# Patient Record
Sex: MALE | Race: WHITE | URBAN - METROPOLITAN AREA
[De-identification: names, ages, dates, MRNs, and addresses within clinical notes are randomized per-mention and may not be internally consistent; named-entity substitution may affect disease eponyms.]

---

## 2021-09-19 ENCOUNTER — HOSPITAL ENCOUNTER (EMERGENCY)
Age: 31
Discharge: HOME | End: 2021-09-19
Attending: EMERGENCY MEDICINE

## 2021-09-19 VITALS
TEMPERATURE: 98.7 F | WEIGHT: 215 LBS | HEIGHT: 73 IN | SYSTOLIC BLOOD PRESSURE: 165 MMHG | HEART RATE: 78 BPM | RESPIRATION RATE: 16 BRPM | DIASTOLIC BLOOD PRESSURE: 87 MMHG | BODY MASS INDEX: 28.49 KG/M2 | OXYGEN SATURATION: 96 %

## 2021-09-19 DIAGNOSIS — F10.920 ALCOHOLIC INTOXICATION WITHOUT COMPLICATION (HCC): Primary | ICD-10-CM

## 2021-09-19 DIAGNOSIS — S61.419A LACERATION OF HAND WITHOUT FOREIGN BODY, UNSPECIFIED LATERALITY, INITIAL ENCOUNTER: ICD-10-CM

## 2021-09-19 DIAGNOSIS — R46.89 ABNORMAL BEHAVIOR: ICD-10-CM

## 2021-09-19 PROCEDURE — 99282 EMERGENCY DEPT VISIT SF MDM: CPT

## 2021-09-19 RX ORDER — BACITRACIN 500 UNIT/G
1 PACKET (EA) TOPICAL ONCE
Status: DISCONTINUED | OUTPATIENT
Start: 2021-09-19 | End: 2021-09-19 | Stop reason: HOSPADM

## 2021-09-19 NOTE — ED NOTES
..Discharge summary and discharge medications reviewed with patient and appropriate educational materials and side effects teaching were provided. patient  Given 0 paper prescriptions and 0 electronic prescriptions sent to pt's listed pharmacy. Patient  verbalized understanding of the importance of discussing medications with his or her physician or clinic they will be following up with. No si/s of acute distress prior to discharge. Patient offered wheelchair from treatment area to hospital entrance, patient delined wheelchair.

## 2021-09-19 NOTE — ED PROVIDER NOTES
EMERGENCY DEPARTMENT HISTORY AND PHYSICAL EXAM      Date: 9/19/2021  Patient Name: Dawit Chowdhury  Patient Age and Sex: 32 y.o. male     History of Presenting Illness     Chief Complaint   Patient presents with    Laceration       History Provided By: Patient    HPI: Dawit Chowdhury  Is brought to the ED under police custody for medical clearance. He was found by the police in a [employer] where he had broken in. Admitted to drinking alcohol and currently cannot remember why he went to [employer] in the first place. No medical issues. Has few superficial lacerations and abrasions to right hand, likely from breaking and entering. No other trauma or injuries. During my assessment patient is alert and oriented. Clinically sober enough to provide clear history as well as he can remember. Clear speech. Cooperative. There are no other complaints, changes, or physical findings at this time. PCP: No primary care provider on file. No current facility-administered medications on file prior to encounter. No current outpatient medications on file prior to encounter. Past History     Past Medical History:  History reviewed. No pertinent past medical history. Past Surgical History:  History reviewed. No pertinent surgical history. Family History:  History reviewed. No pertinent family history. Social History:  Social History     Tobacco Use    Smoking status: Never Smoker   Substance Use Topics    Alcohol use: Yes    Drug use: Not Currently       Allergies:  No Known Allergies      Review of Systems   Review of Systems   Constitutional: Negative for appetite change and fever. HENT: Negative. Eyes: Negative. Respiratory: Negative for cough and shortness of breath. Cardiovascular: Negative for chest pain and palpitations. Gastrointestinal: Negative for abdominal pain, nausea and vomiting. Endocrine: Negative. Genitourinary: Negative for dysuria, flank pain and hematuria. Musculoskeletal: Negative for back pain and joint swelling. Skin: Negative. Negative for color change and rash. Neurological: Negative for dizziness, weakness, light-headedness, numbness and headaches. Hematological: Negative for adenopathy. All other systems reviewed and are negative. Physical Exam   Physical Exam  Vitals and nursing note reviewed. HENT:      Head: Atraumatic. Mouth/Throat:      Mouth: Mucous membranes are moist.   Eyes:      General: No scleral icterus. Extraocular Movements: Extraocular movements intact. Conjunctiva/sclera: Conjunctivae normal.      Pupils: Pupils are equal, round, and reactive to light. Cardiovascular:      Rate and Rhythm: Normal rate and regular rhythm. Pulses: Normal pulses. Heart sounds: Normal heart sounds. Pulmonary:      Effort: Pulmonary effort is normal.      Breath sounds: Normal breath sounds. Abdominal:      Palpations: Abdomen is soft. Tenderness: There is no abdominal tenderness. Musculoskeletal:         General: No swelling or deformity. Normal range of motion. Cervical back: Normal range of motion and neck supple. Skin:     General: Skin is warm and dry. Capillary Refill: Capillary refill takes less than 2 seconds. Comments: Few superficial abrasions and lacerations on fingers of both hands. Hemostatic. No foreign bodies. Full rom. No swelling. Good cap refill in all fingers   Neurological:      General: No focal deficit present. Mental Status: He is alert. Psychiatric:         Mood and Affect: Mood normal.         Behavior: Behavior normal.          Diagnostic Study Results     Labs -   No results found for this or any previous visit (from the past 12 hour(s)). Radiologic Studies -   No orders to display     CT Results  (Last 48 hours)    None        CXR Results  (Last 48 hours)    None            Medical Decision Making   I am the first provider for this patient.     I reviewed the vital signs, available nursing notes, past medical history, past surgical history, family history and social history. Vital Signs-Reviewed the patient's vital signs. Patient Vitals for the past 12 hrs:   Temp Pulse Resp BP SpO2   09/19/21 0541  78      09/19/21 0525 98.7 °F (37.1 °C) (!) 113 16 (!) 165/87 96 %       Records Reviewed: Nursing Notes and Old Medical Records    Provider Notes (Medical Decision Making):   Patient is in the emergency room in police custody for medical clearance prior to going to [facility]. He is now alert and oriented, cooperative. Denies history of medical problems or recent illness. Admits to drinking too much this evening and, now that he has sobered up, he is unable to explain how he ended up at [employer]. Other than superficial injuries to his hand as described above, he has a benign physical exam.   Denies si/hi. Denies illicit drug use. Vital signs are normal.  We will clean and dress abrasions on hand appropriately. He reports that his tetanus is utd. Lacerations and abrasions are very superficial and no need for sutures. Will apply steri strips or similar dressing instead. Once done, medically cleared. ED Course:   Initial assessment performed. The patients presenting problems have been discussed, and they are in agreement with the care plan formulated and outlined with them. I have encouraged them to ask questions as they arise throughout their visit. Alcohol Cessation: Assessment and Intervention  Patient was assessed for alcohol abuse and addiction based on  AUDIT Screening Tool and determined to be medium risk (8 to 15 points). Recommendation: You may drink too much on occasion. This may put you or others at risk. Try to cut down on alcohol or stop drinking completely. .     Discussed the risks of alcohol use and abuse and the long term sequelae of alcohol with the patient such as increased risk of cirrhosis, alcoholic hepatitis and gastritis, pancreatitis, esophageal varices, heart attack, stroke, peripheral artery disease and cancer. Discussed ways to manage cessation at home. Patient was offered follow-up resources on local rehabilitation facilities. Patient declined the resources. The patient verbalized their understanding. . Counseled patient for approximately 15 minutes. Disposition:  Discharge Note:  The patient has been re-evaluated and is ready for discharge. Reviewed available results with patient. Counseled patient on diagnosis and care plan. Patient has expressed understanding, and all questions have been answered. Patient agrees with plan and agrees to follow up as recommended, or to return to the ED if their symptoms worsen. Discharge instructions have been provided and explained to the patient, along with reasons to return to the ED. PLAN:  There are no discharge medications for this patient. 2.   Follow-up Information     Follow up With Specialties Details Why Contact Info    [facility] EMERGENCY DEPT Emergency Medicine  As needed Arnaud Gonzalez  [phone]        3. Return to ED if worse     Diagnosis     Clinical Impression:   1. Alcoholic intoxication without complication ([address]    2. Laceration of hand without foreign body, unspecified laterality, initial encounter    3. Abnormal behavior        Attestations:     Krishan Roque MD, MSc

## 2021-09-19 NOTE — ED NOTES
Emergency Department Nursing Plan of Care       The Nursing Plan of Care is developed from the Nursing assessment and Emergency Department Attending provider initial evaluation. The plan of care may be reviewed in the ED Provider note.     The Plan of Care was developed with the following considerations:   Patient / Family readiness to learn indicated by:verbalized understanding  Persons(s) to be included in education: patient  Barriers to Learning/Limitations:No    [address] Valdemar Lopez RN    9/19/2021   5:30 AM

## 2021-09-19 NOTE — ED NOTES
Attempted to bring patient back. Pt still outside in police car.  stated he was waiting on another officer to bring patient inside due to him being uncooperative and intoxicated.

## 2021-09-19 NOTE — ED TRIAGE NOTES
Patient in police custody reports to the ED with abrasions on bilateral hands after breaking into a building to pull the fire alarm.